# Patient Record
(demographics unavailable — no encounter records)

---

## 2025-06-03 NOTE — DISCUSSION/SUMMARY
[EKG obtained to assist in diagnosis and management of assessed problem(s)] : EKG obtained to assist in diagnosis and management of assessed problem(s) [FreeTextEntry1] : Amlodipine 5 mg po given Discussed importance of reducing salt and alcohol intake Transfer to [facility]

## 2025-06-03 NOTE — DISCUSSION/SUMMARY
[EKG obtained to assist in diagnosis and management of assessed problem(s)] : EKG obtained to assist in diagnosis and management of assessed problem(s) [FreeTextEntry1] : Amlodipine 5 mg po given Discussed importance of reducing salt and alcohol intake Transfer to [facility]

## 2025-06-03 NOTE — CARDIOLOGY SUMMARY
[de-identified] : 5/30/25 sinus rhythm with sinus arrhythmia, rate 88 bpm, RSR' V1 and V2, nonspecific ST abnormality 6/3/25 Sinus Rhythm, rate 90, RSR' V1 and V2, Probably normal

## 2025-06-03 NOTE — HISTORY OF PRESENT ILLNESS
[FreeTextEntry1] : STERLING MOORE is a 61-year-old woman with hypertension referred for cardiac examination before planned cervical polyp removal.  Recent stress level has been high in the setting of her mother's passing.  She has also been drinking more alcohol than usual.  She is not careful with salt intake.  Blood pressure has been mildly elevated for several years often associated with anxiety and stress.  She is very anxious today being here and reports some mild disorientation.  She denies any headache.  She is physically very active and exercises regularly. These activities are well-tolerated without chest discomfort or shortness of breath.  She denies PND, orthopnea, peripheral edema, palpitations, dizziness, presyncope and syncope.   There is no history of hyperlipidemia, diabetes, smoking or renal insufficiency. Family history is notable for her father  from myocardial infarction in the setting of hypertension.  There is no history of drug use.     There is no prior history of a clinical myocardial infarction, coronary revascularization, symptomatic congestive heart failure, rheumatic heart disease, valvular disease, symptomatic arrhythmias including atrial fibrillation.  There is no history of cerebrovascular events.   Laboratory 2025 Na 140, K 4.1, creat 0.91, eGFR 71, glucose 94, Ca 9.3, Hgb 14.4, plt 280,

## 2025-06-03 NOTE — ASSESSMENT
[FreeTextEntry1] : 62 yo F with hypertensive emergency /120, 202/122, 204/124 and 220/128 mmHg.  He is having feeling of mild disorientation although she is not sure at this is related to blood pressure anxiety or anxiety.

## 2025-06-03 NOTE — CARDIOLOGY SUMMARY
[de-identified] : 5/30/25 sinus rhythm with sinus arrhythmia, rate 88 bpm, RSR' V1 and V2, nonspecific ST abnormality 6/3/25 Sinus Rhythm, rate 90, RSR' V1 and V2, Probably normal